# Patient Record
Sex: MALE | URBAN - METROPOLITAN AREA
[De-identification: names, ages, dates, MRNs, and addresses within clinical notes are randomized per-mention and may not be internally consistent; named-entity substitution may affect disease eponyms.]

---

## 2020-03-16 ENCOUNTER — VIRTUAL VISIT (OUTPATIENT)
Dept: FAMILY MEDICINE | Facility: OTHER | Age: 20
End: 2020-03-16

## 2020-03-20 NOTE — PROGRESS NOTES
"Date: 2020 19:40:16  Clinician: Tana Dumont  Clinician NPI: [id number]  Patient: Florian Anand  Patient : 2000  Patient Address: [id number] [place] [address] [place] [address]  Patient Phone: [phone]  Visit Protocol: URI  Patient Summary:  Florian is a 20 year old ( : 2000 ) male who initiated a Visit for COVID-19 (Coronavirus) evaluation and screening. When asked the question \"Please sign me up to receive news, health information and promotions. \", Florian responded \"No\".    Florian states his symptoms started gradually 3-6 days ago.   His symptoms consist of malaise, a headache, rhinitis, myalgia, and a cough. He is experiencing mild difficulty breathing with activities but can speak normally in full sentences. Florian also feels feverish.   Symptom details     Nasal secretions: The color of his mucus is green, clear, and yellow.    Cough: Florian coughs every 5-10 minutes and his cough is more bothersome at night. Phlegm comes into his throat when he coughs. He does not believe his cough is caused by post-nasal drip. The color of the phlegm is clear and yellow.     Temperature: His current temperature is 96.2 degrees Fahrenheit.     Headache: He states the headache is moderate (4-6 on a 10 point pain scale).      Florian denies having ear pain, facial pain or pressure, wheezing, sore throat, nasal congestion, teeth pain, and chills. He also denies having recent facial or sinus surgery in the past 60 days, double sickening (worsening symptoms after initial improvement), taking antibiotic medication for the symptoms, and having a sinus infection within the past year.   Precipitating events  He has recently been exposed to someone with influenza. Florian has been in close contact with the following high risk individuals: adults 65 or older.   Pertinent COVID-19 (Coronavirus) information  Florian has traveled internationally or to the areas where COVID-19 (Coronavirus) is widespread in the last 14 days " before the start of his symptoms. Countries or locations traveled as reported by the patient (free text): [place], [place]   Florian has not had close contact with a suspected or laboratory-confirmed COVID-19 patient within 14 days of symptom onset.   Florian is not a healthcare worker and does not work in a healthcare facility.   Pertinent medical history  Florian needs a return to work/school note.   Weight: 175 lbs   Florian does not smoke or use smokeless tobacco.   Additional information as reported by the patient (free text): I have asthma and carry a rescue inhailer   Weight: 175 lbs    MEDICATIONS: albuterol sulfate inhalation, ALLERGIES: Penicillins, Sulfa (Sulfonamide Antibiotics), amoxicillin  Clinician Response:  Dear Florian,  I am sorry you are not feeling well. Your health is our priority. Based on the information you have provided, it is possible that you may have some type of viral infection.  Please read the full treatment plan and see my recommendations below.  Medication information  Because you have a viral infection, antibiotics will not help you get better. Treating a viral infection with antibiotics could actually make you feel worse.  Self care  The following tips will keep you as comfortable as possible while you recover:     Rest    Drink plenty of water and other liquids    Take a hot shower to loosen congestion    Take a spoonful of honey to reduce your cough     Additional treatment plan   Based on the information you have provided, it is recommended that you go to one of our designated Coronavirus (Covid-19) testing centers to get a test done from your car.  We are offering testing from your car in [facility], [place], [facility] and [place].   To schedule a visit at [facility] or [place], please call [phone] to find out when the next available testing window is. Testing will be done in 1 hour  blocks so that you can wait at home until we are available to more quickly perform your testing from the car.   To complete testing in [place] ONLY, follow the instructions below:    Go as soon as possible during the hours noted to [facility] [address], [place] [address]. Hours: M-F 7:30am-5pm    What to expect:   When you arrive please come park in the parking lot.   Be prepared to present photo ID   Call [phone] and let them know you have arrived.    They will ask for information to get you registered for a visit and will ask for the description of your car and where you are parked.    They will add you to the queue to get your test (you will stay in your car the entire time).   You will then be met by a provider who will perform a brief assessment in your car and collect samples to test for coronavirus and possibly influenza or RSV.    Isolate yourself while traveling.  Do Not allow any visitors within 6 feet.  Do Not go to work or school.  Do Not go to [employer],  centers, shopping, or other public places.  Do Not shake hands.  Avoid close contact with others (hugging, kissing).Protect Others:     Cover Your Mouth and Nose with a mask, disposable tissue or wash cloth to avoid spreading germs to others.  Wash your hands and face frequently with soap and water   Fever Medicines:    For fever relief, take acetaminophen or ibuprofen.  Treat fevers above 101deg F (38.3deg C) to lower fevers and make you more comfortable.   Acetaminophen (e.g., Tylenol): Take 650 mg (two 325 mg pills) by mouth every 4-6 hours as needed of regular strength Tylenol or 1,000 mg (two 500 mg pills) every 8 hours as needed of Extra Strength Tylenol.   Ibuprofen (e.g., Motrin, Advil): Take 400 mg (two 200 mg pills) by mouth every 6 hours as needed.   Acetaminophen is thought to be safer than ibuprofen or naproxen for people over 65 years old. Acetaminophen is in many OTC and  prescription medicines. It might be in more than one medicine that you are taking. You need to be careful and not take an overdose. Before taking any medicine, read all the instructions on the package.  Caution -NSAIDs (e.g., ibuprofen, naproxen): Do not take nonsteroidal anti-inflammatory drugs (NSAIDs) if you have stomach problems, kidney disease, heart failure, or other contraindications to using this type of medicine. Do not take NSAID medicines for over 7 days without consulting your PCP. Do not take NSAID medicines if you are pregnant. Do not take NSAID medicines if you are also taking blood thinners.    Call or submit a new visit if: Breathing difficulty develops or you become worse.  Thank you for limiting contact with others, wearing a simple mask to cover your cough, practice good hand hygiene habits and accessing our virtual services where possible to limit the spread of this virus.  For more information about COVID19 and options for caring for yourself at home, please visit the CDC website at https://www.cdc.gov/coronavirus/2019-ncov/about/steps-when-sick.html  For more options for care at [facility], please visit our website at [email or web address]    Diagnosis: Cough  Diagnosis ICD: R05